# Patient Record
Sex: FEMALE | Race: WHITE | URBAN - METROPOLITAN AREA
[De-identification: names, ages, dates, MRNs, and addresses within clinical notes are randomized per-mention and may not be internally consistent; named-entity substitution may affect disease eponyms.]

---

## 2020-11-10 ENCOUNTER — OFFICE VISIT (OUTPATIENT)
Dept: URBAN - METROPOLITAN AREA CLINIC 91 | Facility: CLINIC | Age: 85
End: 2020-11-10
Payer: MEDICARE

## 2020-11-10 DIAGNOSIS — H35.363 DRUSEN (DEGENERATIVE) OF MACULA, BILATERAL: Primary | ICD-10-CM

## 2020-11-10 PROCEDURE — 99213 OFFICE O/P EST LOW 20 MIN: CPT | Performed by: SPECIALIST

## 2020-11-10 ASSESSMENT — INTRAOCULAR PRESSURE
OD: 15
OS: 14

## 2020-11-10 NOTE — IMPRESSION/PLAN
Impression: Drusen (degenerative) of macula, bilateral: H35.363. Plan: For drusen I have recommended patient use an Amsler grid daily to check their vision, and to call our office immediately if change is noted. I also explained the AREDS vitamin study, and advised patient that it would be beneficial for them to take. I stressed the importance of compliance and regular follow-up appointments.

## 2020-11-10 NOTE — IMPRESSION/PLAN
Impression: Other vitreous opacities, right eye: H43.391. Plan: Results discussed from Dr. Faviola Silvestre, patient is stable. Vision has improved. Continue Pred QID for 1 week, 3, 2, 1 Taper. For Vitreous floaters, I have explained the potential for developing a retinal tear or detachment. Patient was advised to return ASAP is they notice an increase or change in floaters, a change or decrease or vision or a curtain/veil coming over vision. I have explained the risk of vision loss and possible need for retinal detachment repair in detail. Return in [address] Jacqueline Nair for an OCTM.

## 2020-11-16 ENCOUNTER — OFFICE VISIT (OUTPATIENT)
Dept: URBAN - METROPOLITAN AREA CLINIC 91 | Facility: CLINIC | Age: 85
End: 2020-11-16
Payer: MEDICARE

## 2020-11-16 DIAGNOSIS — H21.01 HYPHEMA, RIGHT EYE: Primary | ICD-10-CM

## 2020-11-16 PROCEDURE — 92134 CPTRZ OPH DX IMG PST SGM RTA: CPT | Performed by: SPECIALIST

## 2020-11-16 ASSESSMENT — INTRAOCULAR PRESSURE
OD: 18
OS: 14

## 2020-11-16 NOTE — IMPRESSION/PLAN
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 Plan: Due to Hyphema Restart Prednisolone QID for 1 week, taper 3, 2,1.

## 2020-11-16 NOTE — IMPRESSION/PLAN
Impression: Drusen (degenerative) of macula, bilateral: H35.363. Plan: For drusen I have recommended patient use an Amsler grid daily to check their vision, and to call our office immediately if change is noted. I also explained the AREDS vitamin study, and advised patient that it would be beneficial for them to take. I stressed the importance of compliance and regular follow-up appointments. Follow up in 6 -8 weeks.

## 2020-11-30 ENCOUNTER — OFFICE VISIT (OUTPATIENT)
Dept: URBAN - METROPOLITAN AREA CLINIC 91 | Facility: CLINIC | Age: 85
End: 2020-11-30
Payer: MEDICARE

## 2020-11-30 PROCEDURE — 99213 OFFICE O/P EST LOW 20 MIN: CPT | Performed by: SPECIALIST

## 2020-11-30 ASSESSMENT — INTRAOCULAR PRESSURE
OS: 19
OD: 36

## 2020-11-30 NOTE — IMPRESSION/PLAN
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 Plan: Due to hyphema Continue Prednisolone TID OD. Reduce PF TID OD. Add combigan TID OD for elevated IOP, samples given. Condition is improving. Encouraged patient to follow up with Retina specialist with Dr. Esvin Ortiz  . Follow up in 10 -14 days for IOP check.

## 2020-11-30 NOTE — IMPRESSION/PLAN
Impression: Other vitreous opacities, right eye: H43.389. Plan: For Vitreous floaters, I have explained the potential for developing a retinal tear or detachment. Patient was advised to return ASAP is they notice an increase or change in floaters, a change or decrease or vision or a curtain/veil coming over vision. I have explained the risk of vision loss and possible need for retinal detachment repair in detail.

## 2021-01-12 ENCOUNTER — OFFICE VISIT (OUTPATIENT)
Dept: URBAN - METROPOLITAN AREA CLINIC 91 | Facility: CLINIC | Age: 86
End: 2021-01-12
Payer: MEDICARE

## 2021-01-12 PROCEDURE — 99213 OFFICE O/P EST LOW 20 MIN: CPT | Performed by: SPECIALIST

## 2021-01-12 RX ORDER — PREDNISOLONE ACETATE 10 MG/ML
1 % SUSPENSION/ DROPS OPHTHALMIC
Qty: 7 | Refills: 1 | Status: ACTIVE
Start: 2021-01-12

## 2021-01-12 ASSESSMENT — INTRAOCULAR PRESSURE
OS: 14
OD: 18

## 2021-01-12 NOTE — IMPRESSION/PLAN
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 Plan: Microhyphema OD continue with Prenisolone BID OD and Combigan BID OU.

## 2021-02-01 ENCOUNTER — OFFICE VISIT (OUTPATIENT)
Dept: URBAN - METROPOLITAN AREA CLINIC 91 | Facility: CLINIC | Age: 86
End: 2021-02-01
Payer: MEDICARE

## 2021-02-01 DIAGNOSIS — H43.391 OTHER VITREOUS OPACITIES, RIGHT EYE: ICD-10-CM

## 2021-02-01 PROCEDURE — 99213 OFFICE O/P EST LOW 20 MIN: CPT | Performed by: SPECIALIST

## 2021-02-01 PROCEDURE — 92134 CPTRZ OPH DX IMG PST SGM RTA: CPT | Performed by: SPECIALIST

## 2021-02-01 RX ORDER — BIMATOPROST 0.1 MG/ML
0.01 % SOLUTION/ DROPS OPHTHALMIC
Qty: 2.5 | Refills: 2 | Status: INACTIVE
Start: 2021-02-01 | End: 2021-03-02

## 2021-02-01 ASSESSMENT — INTRAOCULAR PRESSURE
OS: 13
OD: 26

## 2021-02-01 ASSESSMENT — VISUAL ACUITY
OD: HM
OS: 20/30

## 2021-02-01 NOTE — IMPRESSION/PLAN
Impression: Drusen (degenerative) of macula, bilateral: H35.363. Plan: OCTm reviewed. For drusen I have recommended patient use an Amsler grid daily to check their vision, and to call our office immediately if change is noted. I also explained the AREDS vitamin study, and advised patient that it would be beneficial for them to take. I stressed the importance of compliance and regular follow-up appointments.

## 2021-02-01 NOTE — IMPRESSION/PLAN
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 Plan: Discussed OD hyphema, and that it maybe due to phacodenesis od (unable to see today) may have to replace lens. will discuss with retina for any decisions are made. fpr high IOP, Continue PF 1% BID OD+Combigan BID OU. Add Lumigan OD QD OD due to IOP being High. Will speak with Dr. 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 Patient to see Dr. Getachew Montalvo on the 15th of this month. Return as scheduled.

## 2021-02-01 NOTE — IMPRESSION/PLAN
Impression: Other vitreous opacities, right eye: H43.906. Plan: For Vitreous floaters OS, I have explained the potential for developing a retinal tear or detachment. Patient was advised to return ASAP is they notice an increase or change in floaters, a change or decrease or vision or a curtain/veil coming over vision. I have explained the risk of vision loss and possible need for retinal detachment repair in detail.

## 2021-03-22 ENCOUNTER — OFFICE VISIT (OUTPATIENT)
Dept: URBAN - METROPOLITAN AREA CLINIC 91 | Facility: CLINIC | Age: 86
End: 2021-03-22
Payer: MEDICARE

## 2021-03-22 PROCEDURE — 99212 OFFICE O/P EST SF 10 MIN: CPT | Performed by: SPECIALIST

## 2021-03-22 RX ORDER — DONEPEZIL HYDROCHLORIDE 10 MG/1
10 MG TABLET, FILM COATED ORAL
Refills: 0 | Status: ACTIVE
Start: 2021-03-22

## 2021-03-22 RX ORDER — HYDROCHLOROTHIAZIDE 25 MG/1
25 MG TABLET ORAL
Refills: 0 | Status: ACTIVE
Start: 2021-03-22

## 2021-03-22 ASSESSMENT — INTRAOCULAR PRESSURE
OD: 18
OS: 11

## 2021-03-22 ASSESSMENT — VISUAL ACUITY
OS: 20/40
OD: 20/80

## 2021-03-22 NOTE — IMPRESSION/PLAN
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 Plan: Hyphema OD has been evaluated by Dr. Graham Pina. Hyphema resolved at this time. Encouraged patient to get MRX after 1 more month.

## 2023-06-01 ENCOUNTER — OFFICE VISIT (OUTPATIENT)
Dept: URBAN - METROPOLITAN AREA CLINIC 91 | Facility: CLINIC | Age: 88
End: 2023-06-01
Payer: MEDICARE

## 2023-06-01 DIAGNOSIS — H43.11 VITREOUS HEMORRHAGE, RIGHT EYE: Primary | ICD-10-CM

## 2023-06-01 PROCEDURE — 99214 OFFICE O/P EST MOD 30 MIN: CPT | Performed by: OPHTHALMOLOGY

## 2023-06-01 PROCEDURE — 92134 CPTRZ OPH DX IMG PST SGM RTA: CPT | Performed by: OPHTHALMOLOGY

## 2023-06-01 ASSESSMENT — INTRAOCULAR PRESSURE
OD: 20
OS: 13

## 2023-06-01 NOTE — IMPRESSION/PLAN
Impression: Vitreous hemorrhage, right eye: H43.11. Plan: For Vitreous Heme in OD I have explained signs and symptoms associated with retinal tear or detachment. Patient was advised to return ASAP if they notice an increase or change in floaters, change or decrease in vision or a veil or curtain coming down over their vision. I have explained the risk of vision loss, and possible need for retinal detachment repair in detail. Patient was also provided with an informational pamphlet. Will refer to Retina spec. next avail or next week for Vit heme in OD.